# Patient Record
Sex: MALE | URBAN - METROPOLITAN AREA
[De-identification: names, ages, dates, MRNs, and addresses within clinical notes are randomized per-mention and may not be internally consistent; named-entity substitution may affect disease eponyms.]

---

## 2017-03-24 ENCOUNTER — TELEPHONE (OUTPATIENT)
Dept: OPHTHALMOLOGY | Facility: CLINIC | Age: 27
End: 2017-03-24

## 2017-03-24 NOTE — TELEPHONE ENCOUNTER
Pt returned call  Pt stated no eye pain  Started having right eye proptosis last June and was having blurred vision, but mostly due to exposure  Blurred vision has improved    Offered appt next tues with dr. Sherwood for eval first, but unable-- work notices/finances for trip to [place]  No neuro clinic following week (no neuro appts til thurs) and dr. Rodas out     Scheduled with dr. Lux in 2 week time frame and early in week eval if orbital decompression needed    Note to facilitators for review  Pt aware appt could change to another provided pending review--   Yaw Lawton RN 4:51 PM 03/24/17

## 2017-03-24 NOTE — TELEPHONE ENCOUNTER
H/o thyroid eye disease  Pt seen recently by Dr. Cecil Hernandez  Worsening eye pain and concern may need orbital decompression per voicemessage from referring clinic-- was unable to reach on call back  Only number in our [facility]ia [facility] system in our system for pt  Called twice today and left message with direct number-- other 2 number in pt demographics not working  No call back by 1615    Has been seen by Dr. Rodas in past (Dr. Rodas out next week)  Would like to schedule early next week with neuro ophthalmology for nerve changes and eval if orbital decompression needed when pt calls back    Note to facilitator with dr. Sherwood/p eye triage for call back Monday  Yaw Lawton RN 4:20 PM 03/24/17

## 2017-03-24 NOTE — TELEPHONE ENCOUNTER
"----- Message from Jannette Briggs sent at 3/24/2017 12:10 PM CDT -----  Regarding: Aditi from Dr. Cecil Hernandez's office in [place], [place], called to schedule Pt...  Contact: [phone]  With Thyroid eye disease & occular plastics.  Pt needs to come in within the next \"2 weeks\"!   First avail was not until 5/15/17.    Please call Pt at ph# [phone] to schedule him, please.  If need be, you can reach Aditi at ph# [phone].    Thank you!  [place]    Please DO NOT send this message and/or reply back to sender.  Call Center Representatives DO NOT respond to messages.    "

## 2017-03-29 DIAGNOSIS — E07.9 THYROID EYE DISEASE: Primary | ICD-10-CM

## 2017-03-29 DIAGNOSIS — H57.89 THYROID EYE DISEASE: Primary | ICD-10-CM

## 2017-04-03 ENCOUNTER — OFFICE VISIT (OUTPATIENT)
Dept: OPHTHALMOLOGY | Facility: CLINIC | Age: 27
End: 2017-04-03

## 2017-04-03 DIAGNOSIS — H05.243 CONSTANT EXOPHTHALMOS OF BOTH EYES: ICD-10-CM

## 2017-04-03 DIAGNOSIS — E07.9 THYROID EYE DISEASE: Primary | ICD-10-CM

## 2017-04-03 DIAGNOSIS — H57.89 THYROID EYE DISEASE: Primary | ICD-10-CM

## 2017-04-03 DIAGNOSIS — H16.213 EXPOSURE KERATOCONJUNCTIVITIS, BILATERAL: ICD-10-CM

## 2017-04-03 RX ORDER — PROPRANOLOL HYDROCHLORIDE 10 MG/1
10 TABLET ORAL
COMMUNITY
Start: 2016-08-12 | End: 2017-04-25

## 2017-04-03 RX ORDER — LEVOTHYROXINE SODIUM 125 UG/1
125 TABLET ORAL EVERY MORNING
COMMUNITY
Start: 2017-03-08

## 2017-04-03 RX ORDER — ERYTHROMYCIN 5 MG/G
OINTMENT OPHTHALMIC
COMMUNITY
Start: 2016-05-24

## 2017-04-03 ASSESSMENT — VISUAL ACUITY
OS_CC: 20/20
METHOD: SNELLEN - LINEAR
OD_CC+: -2
OD_CC: 20/40

## 2017-04-03 ASSESSMENT — CUP TO DISC RATIO
OD_RATIO: 0.4
OS_RATIO: 0.4

## 2017-04-03 ASSESSMENT — TONOMETRY
OD_IOP_MMHG: 12
OS_IOP_MMHG: 15
IOP_METHOD: ICARE

## 2017-04-03 ASSESSMENT — CONF VISUAL FIELD
METHOD: COUNTING FINGERS
OS_NORMAL: 1
OD_NORMAL: 1

## 2017-04-03 ASSESSMENT — EXTERNAL EXAM - RIGHT EYE: OD_EXAM: EXOPHTHALMOS

## 2017-04-03 ASSESSMENT — EXTERNAL EXAM - LEFT EYE: OS_EXAM: EXOPHTHALMOS

## 2017-04-03 NOTE — MR AVS SNAPSHOT
After Visit Summary   4/3/2017    Valentino Conner    MRN: [id number]           Patient Information     Date Of Birth          1990        Visit Information        Provider Department      4/3/2017 1:15 PM Madhav Lux MD [facility]        Today's Diagnoses     Thyroid eye disease    -  1    Constant exophthalmos of both eyes        Exposure keratoconjunctivitis, bilateral           Follow-ups after your visit        Follow-up notes from your care team     Return in about 4 weeks (around 2017).      Future tests that were ordered for you today     Open Future Orders        Priority Expected Expires Ordered    External Photos Thyroid Series Routine  4/3/2018 4/3/2017            Who to contact     Please call your clinic at [phone] to:    Ask questions about your health    Make or cancel appointments    Discuss your medicines    Learn about your test results    Speak to your doctor   If you have compliments or concerns about an experience at your clinic, or if you wish to file a complaint, please contact [facility] Patient Relations at [phone] or email us at [email or web address]         Additional Information About Your Visit        [employer] Information     [employer] is an electronic gateway that provides easy, online access to your medical records. With [employer], you can request a clinic appointment, read your test results, renew a prescription or communicate with your care team.     To sign up for [employer] visit the website at [email or web address]   You will be asked to enter the access code listed below, as well as some personal information. Please follow the directions to create your username and password.     Your access code is: [email or web address]  Expires: 2017  6:30 AM     Your access code will  in 90 days. If you need help or a new code, please contact your [facility] Clinic or call [phone]  for assistance.        Care EveryWhere ID     This is your Care EveryWhere ID. This could be used by other organizations to access your [facility] medical records  [id number]         Blood Pressure from Last 3 Encounters:   No data found for BP    Weight from Last 3 Encounters:   No data found for Wt              We Performed the Following     Collette-Operative Worksheet (Plastics)        Primary Care Provider    No Pcp Confirmed       No address on file        Thank you!     Thank you for choosing [facility]  for your care. Our goal is always to provide you with excellent care. Hearing back from our patients is one way we can continue to improve our services. Please take a few minutes to complete the written survey that you may receive in the mail after your visit with us. Thank you!             Your Updated Medication List - Protect others around you: Learn how to safely use, store and throw away your medicines at www.disposemymeds.org.          This list is accurate as of: 4/3/17  3:37 PM.  Always use your most recent med list.                   Brand Name Dispense Instructions for use    erythromycin ophthalmic ointment    ROMYCIN     Place  into the right eye four times a day.       levothyroxine 125 MCG tablet    SYNTHROID/LEVOTHROID         moxifloxacin 0.5 % ophthalmic solution    VIGAMOX     Place 1 drop into both eyes every 2 hours (while awake)       propranolol 10 MG tablet    INDERAL     Take 10 mg by mouth

## 2017-04-03 NOTE — PROGRESS NOTES
Valentino Conner is a 26 year old male who presents for followup of APRIL. He had thyroid ablation in March 7, started levothyroxine 125 mg and propranolol 10 mg every other day, and feels like tachycardia is improving. He feels like proptosis is slightly better, vision is slightly better in right eye. There is some improvement in irritation of the right eye    Denies diplopia    TSIG 6.0 8/2016  IGG 2200 8/2016    Assessment & Plan      Valentino Conner is a 26 year old male with the following diagnoses:   1. Thyroid eye disease    2. Constant exophthalmos of both eyes    3. Exposure keratoconjunctivitis, bilateral       -VA stable at about 20/40  -Proptosis stable at about 105/30/26  -Denies diplopia, but has significant restrictions all gazes  -Significant lower lig retraction and upper lid lag    PLAN:  Schedule bilateral 3 wall decompression with ENT and Dr. Clark Sherwood MD    Attending Physician Attestation:  I have seen and examined this patient .  I have confirmed and edited as necessary the chief complaint(s), history of present illness, review of systems, relevant history, and examination findings as documented by others.  I have personally reviewed the relevant tests, images, and reports as documented above.  I have confirmed and edited as necessary the assessment and plan and agree with this note.    - Madhav Lux MD 3:33 PM 4/3/2017    Photographs were obtained to document the findings recorded under exam. These will be stored for future reference and comparison. The plan is documented under assessment and plan above.   - Madhav Lux MD 3:33 PM 4/3/2017  Today with Valentino Conner, I reviewed the indications, risks, benefits, and alternatives of the proposed surgical procedure including, but not limited to, failure obtain the desired result  and need for additional surgery, bleeding, infection, loss of vision, loss of the eye, and the remote possibility of permanent damage  to any organ system or death with the use of anesthesia.  I provided multiple opportunities for the questions, answered all questions to the best of my ability, and confirmed that my answers and my discussion were understood.     - Madhav Lux MD 3:33 PM 4/3/2017

## 2017-04-03 NOTE — Clinical Note
Cyndi I saw this shruti in clinic today. He still needs a 3wall decompression. I had Lizz set it up with you and Shep asap.  Isreal

## 2017-04-03 NOTE — NURSING NOTE
Chief Complaints and History of Present Illnesses   Patient presents with     Follow Up For     Thyroid eye disease     HPI    Affected eye(s):  Both   Symptoms:     Blurred vision   No double vision      Frequency:  Constant       Do you have eye pain now?:  No      Comments:  Pt states here for a consult for an orbital decompression- pt states vision in left eye is fine but decreased in the right. No pain. AT PRN OU, Erythromycin QID OD.    Alexi Ambrose COT 2:36 PM April 3, 2017

## 2017-04-25 RX ORDER — CYANOCOBALAMIN (VITAMIN B-12) 1000 MCG
200 TABLET, EXTENDED RELEASE ORAL DAILY
COMMUNITY

## 2017-04-26 ENCOUNTER — ANESTHESIA EVENT (OUTPATIENT)
Dept: SURGERY | Facility: AMBULATORY SURGERY CENTER | Age: 27
End: 2017-04-26

## 2017-04-26 NOTE — ANESTHESIA PREPROCEDURE EVALUATION
Physical Exam  Normal systems: cardiovascular, pulmonary and dental    Airway   Mallampati: II  TM distance: >3 FB  Neck ROM: full    Dental     Cardiovascular   Rhythm and rate: regular and normal      Pulmonary    breath sounds clear to auscultation                    Anesthesia Plan      History & Physical Review  History and physical reviewed and following examination; no interval change.    ASA Status:  2 .    NPO Status:  > 6 hours    Plan for General and ETT with Propofol and Intravenous induction. Maintenance will be Balanced.    PONV prophylaxis:  Ondansetron (or other 5HT-3) and Dexamethasone or Solumedrol       Postoperative Care  Postoperative pain management:  IV analgesics, Oral pain medications and Multi-modal analgesia.      Consents  Anesthetic plan, risks, benefits and alternatives discussed with:  Patient..                        ANESTHESIA PREOP EVALUATION    PROCEDURE: Procedure(s):  Bilateral Endoscopic Medial Orbital Decompression, Bilateral Three Wall Orbital Decompression with Shawna Navigation and Sonopet - Wound Class:    - Wound Class:     HPI: Valentino Conner is a 26 year old male who presents for above procedure.    PAST MEDICAL HISTORY:    Past Medical History:   Diagnosis Date     Thyroid disease     Graves       PAST SURGICAL HISTORY:    Past Surgical History:   Procedure Laterality Date     THYROIDECTOMY         PAST ANESTHESIA HISTORY:     No personal or family h/o anesthesia problems    SOCIAL HISTORY:       Social History   Substance Use Topics     Smoking status: Never Smoker     Smokeless tobacco: Not on file     Alcohol use Yes      Comment: rare once per year       ALLERGIES:     No Known Allergies    MEDICATIONS:       (Not in a hospital admission)    Current Outpatient Prescriptions   Medication Sig Dispense Refill     VITAMIN D, CHOLECALCIFEROL, PO Take 2,000 Units by mouth daily       Selenium 200 MCG CAPS Take 200 mcg by mouth daily       levothyroxine  (SYNTHROID/LEVOTHROID) 125 MCG tablet Take 125 mcg by mouth every morning        erythromycin (ROMYCIN) ophthalmic ointment Place  into the right eye four times a day.         Current Outpatient Prescriptions Ordered in [facility]   Medication Sig Dispense Refill     VITAMIN D, CHOLECALCIFEROL, PO Take 2,000 Units by mouth daily       Selenium 200 MCG CAPS Take 200 mcg by mouth daily       levothyroxine (SYNTHROID/LEVOTHROID) 125 MCG tablet Take 125 mcg by mouth every morning        erythromycin (ROMYCIN) ophthalmic ointment Place  into the right eye four times a day.       No current [facility]-ordered facility-administered medications on file.        PHYSICAL EXAM:    Vitals: T Data Unavailable, P Data Unavailable, BP Data Unavailable, R Data Unavailable, SpO2  , Weight Wt Readings from Last 2 Encounters:   No data found for Wt       See doc flowsheet      No Data Recorded    No Data Recorded    No Data Recorded    No Data Recorded    No Data Recorded    No data recorded.  No data recorded.      NPO STATUS: see doc flowsheet    LABS:    BMP:  No results for input(s): NA, POTASSIUM, CHLORIDE, CO2, BUN, CR, GLC, VELASQUEZ in the last 45018 hours.    LFTs:   No results for input(s): PROTTOTAL, ALBUMIN, BILITOTAL, ALKPHOS, AST, ALT, BILIDIRECT in the last 53513 hours.    CBC:   No results for input(s): WBC, RBC, HGB, HCT, MCV, MCH, MCHC, RDW, PLT in the last 39739 hours.    Coags:  No results for input(s): INR, PTT, FIBR in the last 20375 hours.    Imaging:  No orders to display       Can Benavides MD  Anesthesiology Staff  Pager [phone]    4/26/2017  4:29 PM

## 2017-04-27 ENCOUNTER — ANESTHESIA (OUTPATIENT)
Dept: SURGERY | Facility: AMBULATORY SURGERY CENTER | Age: 27
End: 2017-04-27

## 2017-04-27 ENCOUNTER — HOSPITAL ENCOUNTER (OUTPATIENT)
Facility: AMBULATORY SURGERY CENTER | Age: 27
End: 2017-04-27
Attending: OPHTHALMOLOGY

## 2017-04-27 ENCOUNTER — SURGERY (OUTPATIENT)
Age: 27
End: 2017-04-27

## 2017-04-27 VITALS
OXYGEN SATURATION: 92 % | HEIGHT: 67 IN | RESPIRATION RATE: 16 BRPM | DIASTOLIC BLOOD PRESSURE: 81 MMHG | SYSTOLIC BLOOD PRESSURE: 131 MMHG | TEMPERATURE: 98 F | WEIGHT: 225 LBS | BODY MASS INDEX: 35.31 KG/M2

## 2017-04-27 DIAGNOSIS — E05.00 GRAVES' OPHTHALMOPATHY: Primary | ICD-10-CM

## 2017-04-27 RX ORDER — CEPHALEXIN 500 MG/1
500 CAPSULE ORAL 2 TIMES DAILY
Qty: 20 CAPSULE | Refills: 0 | Status: SHIPPED | OUTPATIENT
Start: 2017-04-27 | End: 2017-05-07

## 2017-04-27 RX ORDER — TRIAMCINOLONE ACETONIDE 40 MG/ML
INJECTION, SUSPENSION INTRA-ARTICULAR; INTRAMUSCULAR PRN
Status: DISCONTINUED | OUTPATIENT
Start: 2017-04-27 | End: 2017-04-27 | Stop reason: HOSPADM

## 2017-04-27 RX ORDER — SODIUM CHLORIDE, SODIUM LACTATE, POTASSIUM CHLORIDE, CALCIUM CHLORIDE 600; 310; 30; 20 MG/100ML; MG/100ML; MG/100ML; MG/100ML
INJECTION, SOLUTION INTRAVENOUS CONTINUOUS
Status: DISCONTINUED | OUTPATIENT
Start: 2017-04-27 | End: 2017-04-27 | Stop reason: HOSPADM

## 2017-04-27 RX ORDER — ONDANSETRON 4 MG/1
4 TABLET, ORALLY DISINTEGRATING ORAL EVERY 30 MIN PRN
Status: DISCONTINUED | OUTPATIENT
Start: 2017-04-27 | End: 2017-04-28 | Stop reason: HOSPADM

## 2017-04-27 RX ORDER — ONDANSETRON 4 MG/1
4-8 TABLET, ORALLY DISINTEGRATING ORAL EVERY 8 HOURS PRN
Qty: 10 TABLET | Refills: 1 | Status: SHIPPED | OUTPATIENT
Start: 2017-04-27

## 2017-04-27 RX ORDER — FENTANYL CITRATE 50 UG/ML
25-50 INJECTION, SOLUTION INTRAMUSCULAR; INTRAVENOUS
Status: DISCONTINUED | OUTPATIENT
Start: 2017-04-27 | End: 2017-04-28 | Stop reason: HOSPADM

## 2017-04-27 RX ORDER — OXYCODONE AND ACETAMINOPHEN 5; 325 MG/1; MG/1
1 TABLET ORAL EVERY 4 HOURS PRN
Qty: 40 TABLET | Refills: 0 | Status: SHIPPED | OUTPATIENT
Start: 2017-04-27 | End: 2017-04-27

## 2017-04-27 RX ORDER — PROPOFOL 10 MG/ML
INJECTION, EMULSION INTRAVENOUS PRN
Status: DISCONTINUED | OUTPATIENT
Start: 2017-04-27 | End: 2017-04-27

## 2017-04-27 RX ORDER — AMOXICILLIN 250 MG
1-2 CAPSULE ORAL 2 TIMES DAILY
Qty: 30 TABLET | Refills: 0 | Status: SHIPPED | OUTPATIENT
Start: 2017-04-27 | End: 2017-04-27

## 2017-04-27 RX ORDER — GLYCOPYRROLATE 0.2 MG/ML
INJECTION, SOLUTION INTRAMUSCULAR; INTRAVENOUS PRN
Status: DISCONTINUED | OUTPATIENT
Start: 2017-04-27 | End: 2017-04-27

## 2017-04-27 RX ORDER — MEPERIDINE HYDROCHLORIDE 25 MG/ML
12.5 INJECTION INTRAMUSCULAR; INTRAVENOUS; SUBCUTANEOUS
Status: DISCONTINUED | OUTPATIENT
Start: 2017-04-27 | End: 2017-04-28 | Stop reason: HOSPADM

## 2017-04-27 RX ORDER — DEXAMETHASONE SODIUM PHOSPHATE 10 MG/ML
10 INJECTION, SOLUTION INTRAMUSCULAR; INTRAVENOUS ONCE
Status: COMPLETED | OUTPATIENT
Start: 2017-04-27 | End: 2017-04-27

## 2017-04-27 RX ORDER — METHYLPREDNISOLONE 4 MG
TABLET, DOSE PACK ORAL
Qty: 21 TABLET | Refills: 0 | Status: SHIPPED | OUTPATIENT
Start: 2017-04-27

## 2017-04-27 RX ORDER — KETOROLAC TROMETHAMINE 30 MG/ML
30 INJECTION, SOLUTION INTRAMUSCULAR; INTRAVENOUS EVERY 6 HOURS PRN
Status: DISCONTINUED | OUTPATIENT
Start: 2017-04-27 | End: 2017-04-28 | Stop reason: HOSPADM

## 2017-04-27 RX ORDER — ERYTHROMYCIN 5 MG/G
OINTMENT OPHTHALMIC
Qty: 3.5 G | Refills: 0 | Status: SHIPPED | OUTPATIENT
Start: 2017-04-27

## 2017-04-27 RX ORDER — ONDANSETRON 2 MG/ML
4 INJECTION INTRAMUSCULAR; INTRAVENOUS EVERY 30 MIN PRN
Status: DISCONTINUED | OUTPATIENT
Start: 2017-04-27 | End: 2017-04-28 | Stop reason: HOSPADM

## 2017-04-27 RX ORDER — ONDANSETRON 2 MG/ML
INJECTION INTRAMUSCULAR; INTRAVENOUS PRN
Status: DISCONTINUED | OUTPATIENT
Start: 2017-04-27 | End: 2017-04-27

## 2017-04-27 RX ORDER — TOBRAMYCIN AND DEXAMETHASONE 3; 1 MG/ML; MG/ML
1 SUSPENSION/ DROPS OPHTHALMIC 4 TIMES DAILY
Qty: 2 ML | Refills: 0 | Status: SHIPPED | OUTPATIENT
Start: 2017-04-27 | End: 2017-05-07

## 2017-04-27 RX ORDER — LIDOCAINE HYDROCHLORIDE 20 MG/ML
INJECTION, SOLUTION INFILTRATION; PERINEURAL PRN
Status: DISCONTINUED | OUTPATIENT
Start: 2017-04-27 | End: 2017-04-27

## 2017-04-27 RX ORDER — AMOXICILLIN 250 MG
1-2 CAPSULE ORAL 2 TIMES DAILY
Qty: 30 TABLET | Refills: 0 | Status: SHIPPED | OUTPATIENT
Start: 2017-04-27

## 2017-04-27 RX ORDER — LIDOCAINE HYDROCHLORIDE AND EPINEPHRINE 10; 10 MG/ML; UG/ML
INJECTION, SOLUTION INFILTRATION; PERINEURAL PRN
Status: DISCONTINUED | OUTPATIENT
Start: 2017-04-27 | End: 2017-04-27 | Stop reason: HOSPADM

## 2017-04-27 RX ORDER — FENTANYL CITRATE 50 UG/ML
INJECTION, SOLUTION INTRAMUSCULAR; INTRAVENOUS PRN
Status: DISCONTINUED | OUTPATIENT
Start: 2017-04-27 | End: 2017-04-27

## 2017-04-27 RX ORDER — SODIUM CHLORIDE, SODIUM LACTATE, POTASSIUM CHLORIDE, CALCIUM CHLORIDE 600; 310; 30; 20 MG/100ML; MG/100ML; MG/100ML; MG/100ML
INJECTION, SOLUTION INTRAVENOUS CONTINUOUS
Status: DISCONTINUED | OUTPATIENT
Start: 2017-04-27 | End: 2017-04-28 | Stop reason: HOSPADM

## 2017-04-27 RX ORDER — LIDOCAINE 40 MG/G
CREAM TOPICAL
Status: DISCONTINUED | OUTPATIENT
Start: 2017-04-27 | End: 2017-04-27 | Stop reason: HOSPADM

## 2017-04-27 RX ORDER — CEFAZOLIN SODIUM 1 G/3ML
1 INJECTION, POWDER, FOR SOLUTION INTRAMUSCULAR; INTRAVENOUS SEE ADMIN INSTRUCTIONS
Status: DISCONTINUED | OUTPATIENT
Start: 2017-04-27 | End: 2017-04-27 | Stop reason: HOSPADM

## 2017-04-27 RX ORDER — GABAPENTIN 300 MG/1
300 CAPSULE ORAL ONCE
Status: COMPLETED | OUTPATIENT
Start: 2017-04-27 | End: 2017-04-27

## 2017-04-27 RX ORDER — OXYCODONE AND ACETAMINOPHEN 5; 325 MG/1; MG/1
1-2 TABLET ORAL EVERY 4 HOURS PRN
Qty: 40 TABLET | Refills: 0 | Status: SHIPPED | OUTPATIENT
Start: 2017-04-27

## 2017-04-27 RX ORDER — ACETAMINOPHEN 325 MG/1
975 TABLET ORAL ONCE
Status: COMPLETED | OUTPATIENT
Start: 2017-04-27 | End: 2017-04-27

## 2017-04-27 RX ORDER — NALOXONE HYDROCHLORIDE 0.4 MG/ML
.1-.4 INJECTION, SOLUTION INTRAMUSCULAR; INTRAVENOUS; SUBCUTANEOUS
Status: DISCONTINUED | OUTPATIENT
Start: 2017-04-27 | End: 2017-04-28 | Stop reason: HOSPADM

## 2017-04-27 RX ORDER — OXYMETAZOLINE HYDROCHLORIDE 0.05 G/100ML
SPRAY NASAL PRN
Status: DISCONTINUED | OUTPATIENT
Start: 2017-04-27 | End: 2017-04-27 | Stop reason: HOSPADM

## 2017-04-27 RX ADMIN — DEXAMETHASONE SODIUM PHOSPHATE 10 MG: 10 INJECTION, SOLUTION INTRAMUSCULAR; INTRAVENOUS at 09:02

## 2017-04-27 RX ADMIN — Medication 0.5 MG: at 09:27

## 2017-04-27 RX ADMIN — Medication 10 MG: at 12:10

## 2017-04-27 RX ADMIN — LIDOCAINE HYDROCHLORIDE AND EPINEPHRINE 14 ML: 10; 10 INJECTION, SOLUTION INFILTRATION; PERINEURAL at 09:46

## 2017-04-27 RX ADMIN — TRIAMCINOLONE ACETONIDE 40 MG: 40 INJECTION, SUSPENSION INTRA-ARTICULAR; INTRAMUSCULAR at 12:31

## 2017-04-27 RX ADMIN — Medication 10 MG: at 09:43

## 2017-04-27 RX ADMIN — ONDANSETRON 4 MG: 2 INJECTION INTRAMUSCULAR; INTRAVENOUS at 12:33

## 2017-04-27 RX ADMIN — Medication 10 MG: at 10:42

## 2017-04-27 RX ADMIN — Medication 10 MG: at 09:27

## 2017-04-27 RX ADMIN — LIDOCAINE HYDROCHLORIDE 100 MG: 20 INJECTION, SOLUTION INFILTRATION; PERINEURAL at 08:50

## 2017-04-27 RX ADMIN — Medication 0.5 MG: at 12:14

## 2017-04-27 RX ADMIN — Medication 40 MG: at 08:50

## 2017-04-27 RX ADMIN — Medication 10 MG: at 11:29

## 2017-04-27 RX ADMIN — FENTANYL CITRATE 100 MCG: 50 INJECTION, SOLUTION INTRAMUSCULAR; INTRAVENOUS at 08:48

## 2017-04-27 RX ADMIN — GLYCOPYRROLATE 0.2 MG: 0.2 INJECTION, SOLUTION INTRAMUSCULAR; INTRAVENOUS at 08:48

## 2017-04-27 RX ADMIN — OXYMETAZOLINE HYDROCHLORIDE 15 ML: 0.05 SPRAY NASAL at 09:46

## 2017-04-27 RX ADMIN — ACETAMINOPHEN 975 MG: 325 TABLET ORAL at 07:39

## 2017-04-27 RX ADMIN — Medication 0.5 MG: at 10:59

## 2017-04-27 RX ADMIN — Medication 10 MG: at 09:56

## 2017-04-27 RX ADMIN — SODIUM CHLORIDE, SODIUM LACTATE, POTASSIUM CHLORIDE, CALCIUM CHLORIDE: 600; 310; 30; 20 INJECTION, SOLUTION INTRAVENOUS at 07:40

## 2017-04-27 RX ADMIN — Medication 10 MG: at 09:11

## 2017-04-27 RX ADMIN — PROPOFOL 200 MG: 10 INJECTION, EMULSION INTRAVENOUS at 08:50

## 2017-04-27 RX ADMIN — GABAPENTIN 300 MG: 300 CAPSULE ORAL at 07:39

## 2017-04-27 NOTE — IP AVS SNAPSHOT
"                  MRN:[id number]                      After Visit Summary   4/27/2017    Valentino Conner    MRN: [id number]           Thank you!     Thank you for choosing [facility] for your care. Our goal is always to provide you with excellent care. Hearing back from our patients is one way we can continue to improve our services. Please take a few minutes to complete the written survey that you may receive in the mail after you visit with us. Thank you!        Patient Information     Date Of Birth          1990        About your hospital stay     You were admitted on:  April 27, 2017 [facility]    You were discharged on:  April 27, 2017       Who to Call     For medical emergencies, please call 911.  For non-urgent questions about your medical care, please call your primary care provider or clinic, None  For questions related to your surgery, please call your surgery clinic        Attending Provider     Provider Madhav Nicolas MD Ophthalmology       Primary Care Provider    No Pcp Confirmed       No address on file        After Care Instructions     Diet Instructions       Resume pre procedure diet            Discharge Instructions       Follow up with Dr Augustin in 2-3 weeks. If you have questions or concerns during the day please call ENT clinic at [phone]. After hours you may call [facility] at [phone] and ask for the \"ENT resident on call\".    -- Open your mouth when you sneeze or cough as to not build up pressure in your sinuses. Try not to lean forward and allow blood to build up in your head. Do not blow your nose. No lifting greater than 10 lbs for 2 weeks.  -- Please notify your doctor if you experience increased purulent nasal drainage and fevers (greater than 101.4)  suspicious for infection. If you feel it is acute, please return to the emergency department.   -- Bloody nasal drainage is expected the first few " "days. Use nasal saline frequently throughout day. Use the oxymetazoline spray only if there is active bright red bleeding. If active bleeding, spray each nostril with the oxymetazoline and hold pressure x 20 minutes. If this does not resolve, repeat. If it persists, go to the ER to be evaluated.  -- You should take over-the-counter stool softeners when you are taking narcotics such as vicodin/percocet/oxycodone/norco. Wean yourself from the narcotic pain medicines as soon as able. Transition to over-the-counter tylenol (650mg every 4 hours as needed).  -- If you have questions or concerns during the day please call ENT clinic and [phone]. After hours you may call [facility] at [phone] and ask for the \"ENT resident on call\".            Discharge Medication Instructions       Do NOT take aspirin or medications containing NSAIDS for 72 hours after procedure.            Ice to affected area       Apply cold pack for 15 minutes on, 15 minutes off, for 48 hours while awake.                  Further instructions from your care team       [facility]  Home Care Following Anesthesia  For 24 hours after surgery:  1. Get plenty of rest.  A responsible adult must stay with you for at least 24 hours after you leave the surgery center.  2. Do not drive or use heavy equipment.  If you have weakness or tingling, don't drive or use heavy equipment until this feeling goes away.   3. Do not drink alcohol.   4. Avoid strenuous or risky activities.  Ask for help when climbing stairs.  5. You may feel lightheaded.  IF so, sit for a few minutes before standing.  Have someone help you get up.   6. If you have nausea (feel sick to your stomach): Drink only clear liquids such as apple juice, ginger ale, broth or 7-Up.  Rest may also help.  Be sure to drink enough fluids.  Move to a regular diet as you feel able.   7. You may have a slight fever.  Call the doctor if your fever is over " 100 F (37.7 C) (taken under the tongue) or lasts longer than 24 hours.  8. You may have a dry mouth, a sore throat, muscle aches or trouble sleeping. These should go away after 24 hours.  9. Do not make important or legal decisions.               Tips for taking pain medications  To get the best pain relief possible, remember these points:    Take pain medications as directed, before pain becomes severe.    Pain medication can upset your stomach: taking it with food may help.    Constipation is a common side effect of pain medication. Drink plenty of  fluids.    Eat foods high in fiber. Take a stool softener if recommended by your doctor or pharmacist.    Do not drink alcohol, drive or operate machinery while taking pain medications.    Ask about other ways to control pain, such as with heat, ice or relaxation.    Call a doctor for any of the followin. Signs of infection (fever, growing tenderness at the surgery site, a large amount of drainage or bleeding, severe pain, foul-smelling drainage, redness, swelling).  2. It has been over 8 to 10 hours since surgery and you are still not able to urinate (pass water).  3. Headache for over 24 hours.  4. Numbness, tingling or weakness the day after surgery (if you had spinal anesthesia).  Your doctor is:  Dr. Ty Augustin, ENT Otolaryngology: [phone]  Dr. Cyndi Rodas, Ophthalmology: [phone]               Or dial [phone] and ask for the resident on call for:  Ophthalmology or ENT  For emergency care, call the:  [facility]:  [phone] (TTY for hearing impaired: [phone])                Pending Results     No orders found from 2017 to 2017.            Admission Information     Date & Time Provider Department Dept. Phone    2017 Madhav Lux MD [facility] [phone]      Your Vitals Were     Blood Pressure Temperature Respirations Height Weight Pulse Oximetry    133/80 98.6  F  "(37  C) (Temporal) 16 1.702 m (5' 7\") 102.1 kg (225 lb) 100%    BMI (Body Mass Index)                   35.24 kg/m2           MyChart Information     [employer] is an electronic gateway that provides easy, online access to your medical records. With [employer], you can request a clinic appointment, read your test results, renew a prescription or communicate with your care team.     To sign up for [employer] visit the website at [email or web address]   You will be asked to enter the access code listed below, as well as some personal information. Please follow the directions to create your username and password.     Your access code is: [email or web address]  Expires: 2017  6:30 AM     Your access code will  in 90 days. If you need help or a new code, please contact your [facility] or call [phone] for assistance.        Care EveryWhere ID     This is your Care EveryWhere ID. This could be used by other organizations to access your [facility] medical records  [id number]           Review of your medicines      START taking        Dose / Directions    cephALEXin 500 MG capsule   Commonly known as:  KEFLEX   Used for:  Graves' ophthalmopathy        Dose:  500 mg   Take 1 capsule (500 mg) by mouth 2 times daily for 10 days   Quantity:  20 capsule   Refills:  0       methylPREDNISolone 4 MG tablet   Commonly known as:  MEDROL DOSEPAK   Used for:  Graves' ophthalmopathy        Follow package instructions   Quantity:  21 tablet   Refills:  0       ondansetron 4 MG ODT tab   Commonly known as:  ZOFRAN ODT   Used for:  Graves' ophthalmopathy        Dose:  4-8 mg   Take 1-2 tablets (4-8 mg) by mouth every 8 hours as needed for nausea   Quantity:  10 tablet   Refills:  1       oxyCODONE-acetaminophen 5-325 MG per tablet   Commonly known as:  PERCOCET   Used for:  Graves' ophthalmopathy        Dose:  1-2 tablet   Take 1-2 tablets by mouth every 4 hours as needed for pain   Quantity:  40 tablet "   Refills:  0       senna-docusate 8.6-50 MG per tablet   Commonly known as:  SENOKOT-S;PERICOLACE   Used for:  Graves' ophthalmopathy        Dose:  1-2 tablet   Take 1-2 tablets by mouth 2 times daily Take while on oral narcotics to prevent or treat constipation.   Quantity:  30 tablet   Refills:  0       tobramycin-dexamethasone 0.3-0.1 % ophthalmic susp   Commonly known as:  TOBRADEX   Used for:  Graves' ophthalmopathy        Dose:  1 drop   Place 1 drop into both eyes 4 times daily for 10 days Instill into operative eye(s) per physician instructions.   Quantity:  2 mL   Refills:  0         CONTINUE these medicines which may have CHANGED, or have new prescriptions. If we are uncertain of the size of tablets/capsules you have at home, strength may be listed as something that might have changed.        Dose / Directions    * erythromycin ophthalmic ointment   Commonly known as:  ROMYCIN   This may have changed:  Another medication with the same name was added. Make sure you understand how and when to take each.        Place  into the right eye four times a day.   Refills:  0       * erythromycin ophthalmic ointment   Commonly known as:  ROMYCIN   This may have changed:  You were already taking a medication with the same name, and this prescription was added. Make sure you understand how and when to take each.   Used for:  Graves' ophthalmopathy        Apply small amount to incision sites and into both eyes 4 x daily   Quantity:  3.5 g   Refills:  0       * Notice:  This list has 2 medication(s) that are the same as other medications prescribed for you. Read the directions carefully, and ask your doctor or other care provider to review them with you.      CONTINUE these medicines which have NOT CHANGED        Dose / Directions    levothyroxine 125 MCG tablet   Commonly known as:  SYNTHROID/LEVOTHROID        Dose:  125 mcg   Take 125 mcg by mouth every morning   Refills:  0       Selenium 200 MCG Caps        Dose:   200 mcg   Take 200 mcg by mouth daily   Refills:  0       VITAMIN D (CHOLECALCIFEROL) PO        Dose:  2000 Units   Take 2,000 Units by mouth daily   Refills:  0            Where to get your medicines      These medications were sent to [facility] [place], [place] - [address]  [address], [facility] [address]    Hours:  TRANSPLANT PHONE NUMBER [phone] Phone:  [phone]     cephALEXin 500 MG capsule    erythromycin ophthalmic ointment    methylPREDNISolone 4 MG tablet    ondansetron 4 MG ODT tab    senna-docusate 8.6-50 MG per tablet    tobramycin-dexamethasone 0.3-0.1 % ophthalmic susp         Some of these will need a paper prescription and others can be bought over the counter. Ask your nurse if you have questions.     Bring a paper prescription for each of these medications     oxyCODONE-acetaminophen 5-325 MG per tablet                Protect others around you: Learn how to safely use, store and throw away your medicines at www.disposemymeds.org.             Medication List: This is a list of all your medications and when to take them. Check marks below indicate your daily home schedule. Keep this list as a reference.      Medications           Morning Afternoon Evening Bedtime As Needed    cephALEXin 500 MG capsule   Commonly known as:  KEFLEX   Take 1 capsule (500 mg) by mouth 2 times daily for 10 days                                * erythromycin ophthalmic ointment   Commonly known as:  ROMYCIN   Place  into the right eye four times a day.                                * erythromycin ophthalmic ointment   Commonly known as:  ROMYCIN   Apply small amount to incision sites and into both eyes 4 x daily                                levothyroxine 125 MCG tablet   Commonly known as:  SYNTHROID/LEVOTHROID   Take 125 mcg by mouth every morning                                methylPREDNISolone 4 MG tablet   Commonly known as:  MEDROL DOSEPAK    Follow package instructions                                ondansetron 4 MG ODT tab   Commonly known as:  ZOFRAN ODT   Take 1-2 tablets (4-8 mg) by mouth every 8 hours as needed for nausea                                oxyCODONE-acetaminophen 5-325 MG per tablet   Commonly known as:  PERCOCET   Take 1-2 tablets by mouth every 4 hours as needed for pain                                Selenium 200 MCG Caps   Take 200 mcg by mouth daily                                senna-docusate 8.6-50 MG per tablet   Commonly known as:  SENOKOT-S;PERICOLACE   Take 1-2 tablets by mouth 2 times daily Take while on oral narcotics to prevent or treat constipation.                                tobramycin-dexamethasone 0.3-0.1 % ophthalmic susp   Commonly known as:  TOBRADEX   Place 1 drop into both eyes 4 times daily for 10 days Instill into operative eye(s) per physician instructions.                                VITAMIN D (CHOLECALCIFEROL) PO   Take 2,000 Units by mouth daily                                * Notice:  This list has 2 medication(s) that are the same as other medications prescribed for you. Read the directions carefully, and ask your doctor or other care provider to review them with you.

## 2017-04-27 NOTE — DISCHARGE INSTRUCTIONS
[facility]  Home Care Following Anesthesia  For 24 hours after surgery:  1. Get plenty of rest.  A responsible adult must stay with you for at least 24 hours after you leave the surgery center.  2. Do not drive or use heavy equipment.  If you have weakness or tingling, don't drive or use heavy equipment until this feeling goes away.   3. Do not drink alcohol.   4. Avoid strenuous or risky activities.  Ask for help when climbing stairs.  5. You may feel lightheaded.  IF so, sit for a few minutes before standing.  Have someone help you get up.   6. If you have nausea (feel sick to your stomach): Drink only clear liquids such as apple juice, ginger ale, broth or 7-Up.  Rest may also help.  Be sure to drink enough fluids.  Move to a regular diet as you feel able.   7. You may have a slight fever.  Call the doctor if your fever is over 100 F (37.7 C) (taken under the tongue) or lasts longer than 24 hours.  8. You may have a dry mouth, a sore throat, muscle aches or trouble sleeping. These should go away after 24 hours.  9. Do not make important or legal decisions.               Tips for taking pain medications  To get the best pain relief possible, remember these points:    Take pain medications as directed, before pain becomes severe.    Pain medication can upset your stomach: taking it with food may help.    Constipation is a common side effect of pain medication. Drink plenty of  fluids.    Eat foods high in fiber. Take a stool softener if recommended by your doctor or pharmacist.    Do not drink alcohol, drive or operate machinery while taking pain medications.    Ask about other ways to control pain, such as with heat, ice or relaxation.    Call a doctor for any of the followin. Signs of infection (fever, growing tenderness at the surgery site, a large amount of drainage or bleeding, severe pain, foul-smelling drainage, redness, swelling).  2. It has been over 8 to 10 hours  since surgery and you are still not able to urinate (pass water).  3. Headache for over 24 hours.  4. Numbness, tingling or weakness the day after surgery (if you had spinal anesthesia).  Your doctor is:  Dr. Ty Augustin, ENT Otolaryngology: [phone]  Dr. Cyndi Rodas, Ophthalmology: [phone]               Or dial [phone] and ask for the resident on call for:  Ophthalmology or ENT  For emergency care, call the:  [facility]:  [phone] (TTY for hearing impaired: [phone])      Post-operative Instructions  Ophthalmic Plastic and Reconstructive Surgery    Cyndi Rodas M.D.     All instructions apply to the operated eye(s) or eyelid(s).    Wound care and personal care  ? If a patch or bandage has been placed, please leave this in place until seen by your physician. Ensure that the bandage does not get wet when you take a shower.  ? Apply ice compresses 15 minutes on 15 minutes off while awake for 2 days, then switch to warm water compresses 4 times a day until seen by your physician. For warm packs you can place a cup of dry uncooked rice in a clean cotton sock. Then place sock in microwave 30 seconds to one minute. Next place the warm sock into a plastic bag and wrap the bag with clean warm wet washcloth and place over operated eye.    ? You may shower or wash your hair the day after surgery. Do not bathe or go swimming for 1 week to prevent contamination of your wounds.  ? Do not apply make-up to the eyes or eyelids for 2 weeks after surgery.  ? Expect some swelling, bruising, black eye (even into the lower eyelids and cheeks). Also expect serum caking, crusting and discharge from the eye and/or incisions. A small amount of surface bleeding is normal for the first 48 hours.  ? Your eye(s) and eyelid(s) may be painful and tender. This is normal after surgery.  Use the pain medication as prescribed. If your pain does not improve despite the  medication, contact the  office.    Contact information and follow-up  ? Return to the Eye Clinic for a follow-up appointment with your physician as  scheduled. If no appointment has been scheduled:   - [facility] eye clinic: [phone] for an appointment with Dr. Rodas within 1 to 2 weeks from your date of surgery.   -  [facility] eye clinic: [phone] for an appointment with Dr. Rodas within 1 to 2 weeks from your date of surgery.     ? For severe pain, bleeding, or loss of vision, call the [facility] at [phone] or [facility] at [phone].     After hours or on weekends and holidays, call [phone] and ask to speak with the ophthalmologist on call.    An on call person can be reached after hours for concerns. The on call doctor should not call in medication refill requests after hours or on weekends, so please plan accordingly. An effort has been made to provide adequate pain medications following every surgery, and refills will not be provided in most instances. Narcotic pain medications cannot be called in.     Activity restrictions and driving  ? Avoid heavy lifting, bending, exercise or strenuous activity for 1 week after surgery.  You may resume other activities and return to work as tolerated.  ? You may not resume driving until have you stopped using narcotic pain medications (such as Norco, Percocet, Tylenol #3).    Medications  ? Restart all your regular home medications and eye drops. If you take Plavix or  Aspirin on a regular basis, wait for 72 hours after your surgery before restarting these in order to decrease the risk of bleeding complications.  ? Avoid aspirin and aspirin-like medications (Motrin, Aleve, Ibuprofen, [place] etc) for 72 hours to reduce the risk of bleeding. You may take Tylenol  (acetaminophen) for pain.  ? In addition to your home medications, take the following post-operative medications as prescribed by  your physician.    ? Apply antibiotic ointment to all sutures three times a day, and into the operated eye(s) at night.  ? Instill eye drops 3 times a day for 10 days.   ? Take antibiotic capsules or tablets as directed. Take steroid taper as directed.   ? Take pain pills at prescribed frequency as needed for pain. Zofran can be taken as needed for nausea.     ? WARNING: All the prescription pain medications listed above contain Tylenol  (acetaminophen). You must not take more than 4,000 mg of acetaminophen per  24-hour period. This is equivalent to 6 tablets of Darvocet, 12 tablets of Norco, Percocet or Tylenol #3. If you take other over-the-counter medications containing acetaminophen, you must take the amount of acetaminophen into account and reduce the number of prescribed pain pills accordingly.  ? The prescribed medications may make you drowsy. You must not drive a car,  operate heavy machinery or drink alcohol while taking them.  ? The prescribed pain medications may cause constipation and nausea. Take them  with some food to prevent a stomach upset. If you continue to experience nausea,  call your physician.

## 2017-04-27 NOTE — OR NURSING
Pt remains lethargic after 1.5 hr in phase 1 recovery. Pt responds appropriately to stimuli; needing supplemental oxygen to maintain sats >92%.  Dr. Benavides aware. Will continue to monitor and transfer to phase 2 recovery when appropriate. Family updated.

## 2017-04-27 NOTE — ANESTHESIA POSTPROCEDURE EVALUATION
Patient: Valentino Conner    Procedure(s):  Bilateral Endoscopic Medial Orbital Decompression, Bilateral Lateral Wall and Floor Orbital Decompression with [place] Navigation and Sonopet - Wound Class: I-Clean   - Wound Class: I-Clean    Diagnosis:Exophthalmos  Diagnosis Additional Information: No value filed.    Anesthesia Type:  General, ETT    Note:  Anesthesia Post Evaluation    Patient location during evaluation: Phase 2  Patient participation: Able to fully participate in evaluation  Level of consciousness: awake (sleepy but responds appropraitely.)  Pain management: adequate  Airway patency: patent  Cardiovascular status: acceptable  Respiratory status: acceptable  Hydration status: acceptable  PONV: none     Anesthetic complications: None          Last vitals:  Vitals:    04/27/17 1506 04/27/17 1521 04/27/17 1536   BP: 133/81 134/89 131/81   Resp: 16 17 16   Temp: 36.8  C (98.3  F) 36.8  C (98.2  F) 36.7  C (98  F)   SpO2: 92% 93% 92%         Electronically Signed By: Can Benavides MD  April 27, 2017  4:26 PM

## 2017-04-27 NOTE — BRIEF OP NOTE
[facility]    Brief Operative Note    Pre-operative diagnosis: Exophthalmos  Post-operative diagnosis * No post-op diagnosis entered *  Procedure: Procedure(s):  Bilateral Endoscopic Medial Orbital Decompression, Bilateral Three Wall Orbital Decompression with [place] Navigation and Sonopet - Wound Class: I-Clean   - Wound Class: I-Clean  Surgeon: Surgeon(s) and Role:  Panel 1:     * Ty Augustin MD - Primary    Panel 2:     * Cyndi Rodas MD - Primary  Anesthesia: General   Estimated blood loss: 30 mL  Drains: None  Specimens:   ID Type Source Tests Collected by Time Destination   A : Right Sinus Contents Tissue Sinus Contents, Right SURGICAL PATHOLOGY EXAM Ty Augustin MD 4/27/2017  9:24 AM    B : Left Sinus Contents Tissue Sinus Contents, Left SURGICAL PATHOLOGY EXAM Ty Augustin MD 4/27/2017  9:24 AM      Findings:   Normal sinus anatomy with appropriate medial orbital wall decompression performed, a gelfilm was placed in each middle meatus.  Complications: None.  Implants: None.

## 2017-04-27 NOTE — IP AVS SNAPSHOT
[facility]    [address] [place] [id number]    Phone:  [phone]    Fax:  [phone]                                       After Visit Summary   4/27/2017    Valentino Conner    MRN: [id number]           After Visit Summary Signature Page     I have received my discharge instructions, and my questions have been answered. I have discussed any challenges I see with this plan with the nurse or doctor.    ..........................................................................................................................................  Patient/Patient Representative Signature      ..........................................................................................................................................  Patient Representative Print Name and Relationship to Patient    ..................................................               ................................................  Date                                            Time    ..........................................................................................................................................  Reviewed by Signature/Title    ...................................................              ..............................................  Date                                                            Time

## 2017-04-27 NOTE — PROGRESS NOTES
Pt in phase 2 of recovery. Pt drowsy but responds quickly to voice. Pt denies sob and nausea. Just some soreness around his eyes. Pt has occasional drops in O2 sats when asleep. Dr. Benavides informed and evaluated pt prior to discharge, Dr. Benavides Ok'd pt to be discharged home. Pt using IS properly. VSS otherwise. Pt discharged home with Brother with DC information and medications.

## 2017-04-27 NOTE — OP NOTE
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      PROCEDURES PERFORMED:   1.  bilateral lateral wall orbital decompression with bone removal.   2.  bilateral partial maxillectomy (removal of orbital floor bone).   3. Temporary Tarsorrhaphy, right and left eye.     Intraoperative navigation was used to safely maximize bone removal  SURGEON:  Cyndi Rodas MD     ASSISTANTS: None     ANESTHESIA:  General with local infiltration of a 50/50 mixture, 2% lidocaine with epinephrine and 0.5% Marcaine.      COMPLICATIONS:  None.      ESTIMATED BLOOD LOSS: 10 mL.      HISTORY:  Valentino Conner  presented with severe exophthalmos secondary to thyroid eye disease.  After risks, benefits and alternatives to the proposed procedure were explained, informed consent was obtained.      DESCRIPTION OF PROCEDURE:  Valentino Conner  was brought to the operating room and placed supine on the operating table.  General anesthesia was induced.  Dr. Augustin completed his portion of the case, which was a bilateral medial wall decompression, done endoscopically. The patient was then redraped. The Nanoogo navigation system was calibrated. The lateral canthus and the inferior fornix were infiltrated with local anesthetic.  The area  was prepped and draped in the typical sterile ophthalmic fashion.  Attention was directed to the left side.  Lateral canthal incision was made with a 15 blade for 2 cm.  Dissection was carried down through the orbicularis with monopolar cautery.  Lateral canthotomy and  cantholysis was performed in the upper and lower eyelids with the monopolar cautery.  The Weitlaner retractor was placed.  Dissection was carried down to the orbital rim.  [place] elevator was used to elevate the periorbita from the lateral orbital wall.  A malleable retractor was placed to protect the orbital  contents.  Using a sonopet, the lateral wall was drilled down to the thin axial bone extending from of the rim internally, posteriorly to the thick bone of the trigone.  This was carried inferiorly to the lateral orbital floor, superiorly to the lacrimal fossa.  Intraoperative navigation was set to work with the sonopet, allowing maximal bone removal. Hemostasis was obtained with bonewax and monopolar cautery. A transconjunctival incision was then made in the fornix.  The Weitlaner was removed.  [place] retractor used to retract the lower eyelid anteriorly and a malleable retractor used to protect the globe.  The dissection was carried down to the orbital rim with a monopolar cautery.  [place] elevator was used to elevate the periosteum from the floor.  The thin bone of the orbital floor was  infractured with the [place] elevator.  Using Deloris forceps and Kerrison rongeurs, the orbital floor was removed nasal to the infraorbital nerve.  Laterally, we drilled with the sonopet through the thick bone and removed the bone again lateral to the nerve with the [place] rongeurs.  The bone removal was taken to the back wall of the maxillary sinus.  Hemostasis was obtained with a monopolar cautery.  The periorbita was opened and the fat allowed to prolapse into the decompressed spaces within the sinuses.  Laterally, the periorbita was opened and the intraconal fat removed with monopolar cautery.  Hemostasis was again obtained.  0.5 mL of Kenalog 40 was injected intraobitally in the inferolateral quadrant of the orbit through the opened periorbita. The superior and inferior analy of lateral canthal tendon were secured to the lateral orbital rim periosteum with a 5-0 Vicryl sutures.  Deep sutures of 5-0 Vicryl were used to close the orbicularis.  The skin was closed with running 6-0 plain gut suture.  Laterally a temporary tarsorrhaphy was fashioned with a  5-0 vicryl suture. Attention was directed to the right side and the  same procedures were performed.   On the right side the 4mm mindi coated drill was used as the Sonopet irrigation stopped working. Navigation was again used throughout the case to safely allow maximal bone removal. The patient tolerated the ophthalmic portions of the procedures well and was in stable condition.       Cyndi Rodas MD

## 2017-04-27 NOTE — ANESTHESIA CARE TRANSFER NOTE
Patient: Valentino Conner    Procedure(s):  Bilateral Endoscopic Medial Orbital Decompression, Bilateral Lateral Wall and Floor Orbital Decompression with Shields Navigation and Sonopet - Wound Class: I-Clean   - Wound Class: I-Clean    Diagnosis: Exophthalmos  Diagnosis Additional Information: No value filed.    Anesthesia Type:   General, ETT     Note:  Airway :Face Mask  Patient transferred to:PACU  Comments: VSS, no PONV, comfortable, report to RN      Vitals: (Last set prior to Anesthesia Care Transfer)    CRNA VITALS  4/27/2017 1225 - 4/27/2017 1303      4/27/2017             Pulse: 105    SpO2: 100 %    Resp Rate (observed): 15                Electronically Signed By: DEE Hunter CRNA  April 27, 2017  1:03 PM

## 2017-04-28 ENCOUNTER — MEDICAL CORRESPONDENCE (OUTPATIENT)
Dept: HEALTH INFORMATION MANAGEMENT | Facility: CLINIC | Age: 27
End: 2017-04-28

## 2017-04-28 NOTE — OP NOTE
SURGEON:  Ty Augustin MD      RESIDENT SURGEON:   1.  Storm Gongora MD   2.  Roxy Mitchell MD      PREOPERATIVE DIAGNOSES:   1.  Graves' disease.   2.  Graves-associated enophthalmos.      POSTOPERATIVE DIAGNOSES:   1.  Graves' disease.   2.  Graves-associated enophthalmos.      PROCEDURE PERFORMED:  Bilateral medial orbital wall decompression.      ANESTHESIA:  General.      COMPLICATIONS:  None.      ESTIMATED BLOOD LOSS:  30 mL.      FINDINGS:  Overall normal sinus anatomy with appropriate decompression of the medial orbital walls bilaterally.      INDICATIONS FOR PROCEDURE:  Valentino Conner is a 26-year-old man with Graves' disease, who has associated thyroid eye disease and significant exophthalmos with exposure keratitis.  After discussion of risks, benefits and alternatives, he was consented for the above stated procedure.      DESCRIPTION OF PROCEDURE:  The patient was brought to the operating room, placed supine on the operating table.  General endotracheal anesthesia was induced.  The patient was turned 90 degrees counterclockwise.  The [employer] Comparealth system was appropriately applied to the patient's face, and the patient was then prepped and draped in the usual fashion.  Afrin-soaked pledgets were placed into the bilateral nasal passages.  An institutional timeout was then performed to correctly identify patient, procedure and site.  The Afrin-soaked pledgets were removed and 1% lidocaine with 1:100,000 epinephrine was injected into the root of the middle turbinate, the inferior turbinate and posteriorly near the area of the sphenopalatine artery bilaterally.  An Afrin-soaked pledget was then placed on the left nasal passage, and we began with the right orbital decompression.  The 0-degree endoscope was inserted into the right nasal passage.  A [place] elevator was used to lateralize the inferior turbinate and medialize the middle turbinate to expose the middle meatus.  An uncinectomy was  performed by using a sickle knife to incise the fontanelle and a straight [place] to remove the uncinate.  The maxillary antrum was then entered with a ball-tipped seeker and then widened using a back biter and a straight TruCut.  After performing the antrostomy, an anterior and posterior ethmoidectomy was performed using a combination of curet and a straight [place] to fully expose the lamina papyracea.  The lamina papyracea was identified using the Stealth-guided suction.  We then made a small fracture in the lamina with a [place] and the bone was gently elevated off of the periorbita posteriorly with care taken to avoid violating the skull base.  We then used a right angle curet to loosely elevate and remove the lamina anteriorly.  There was appropriate decompression of orbital fat and periorbita into the sinus cavity.  The periorbita was entered with a sickle knife and fully decompressed.  An Afrin-soaked pledget was then placed into the middle meatus.  An identical procedure was performed on the left starting with the maxillary antrostomy, the anterior and posterior ethmoidectomy to expose the lamina papyracea and the loose elevation of the lamina papyracea off of the orbital contents.  An incision in the periorbita with a sickle knife and appropriate decompression was observed on the left.  Gelfoams were placed into the bilateral middle meatus and this concluded the ENT portion of the case.  Please see Dr. Hanna's dictation for details of the inferior and lateral orbital wall decompressions.      Dr. Augustin was present for all critical portions of the procedure.         PEG AUGUSTIN MD       As dictated by TAVO QUINTERO MD            D: 2017 19:11   T: 2017 08:57   MT: al      Name:     AJ VALENCIA   MRN:      [id number]        Account:        [id number]   :      1990           Procedure Date: 2017      Document: [id number]

## (undated) DEVICE — SPONGE RAY-TEC 4X8" 7318

## (undated) DEVICE — MASK PT REGISTRATION FOR STRYKER NAV SYSTEM 6001-386-000

## (undated) DEVICE — DRAPE U SPLIT 74X120" 29440

## (undated) DEVICE — SYR 10ML LL W/O NDL

## (undated) DEVICE — BASIN SET SINGLE STERILE 13752-624

## (undated) DEVICE — SUCTION MANIFOLD NEPTUNE 2 SYS 4 PORT 0702-020-000

## (undated) DEVICE — BLADE KNIFE SURG 15 371115

## (undated) DEVICE — NDL 25GA 2"  8881200441

## (undated) DEVICE — TUBING SET ASPIRATION W/EXT SONOPET  LF 5450-850-003

## (undated) DEVICE — SOL NACL 0.9% IRRIG 1000ML BOTTLE 2F7124

## (undated) DEVICE — TIP ASPIRATOR PAYNER ANG SONOPET UNV 25KHZ 360D 5450-800-312

## (undated) DEVICE — PACK ENT ENDOSCOPY CUSTOM ASC

## (undated) DEVICE — LINEN TOWEL PACK X5 5464

## (undated) DEVICE — BLADE KNIFE BEAVER SICKLE EDGE 5.0MM 377300

## (undated) DEVICE — DRAPE STERI TOWEL SM 1000

## (undated) DEVICE — PACK MINOR EYE CUSTOM ASC

## (undated) DEVICE — SPONGE COTTONOID 1/2X3" 20-07S

## (undated) DEVICE — TUBING SUCTION 12"X1/4" N612

## (undated) DEVICE — TUBING STRYKER IRRIGATION CASSETTE 5400-050-001

## (undated) DEVICE — SU PLAIN 6-0 G-1DA 18" 770G

## (undated) DEVICE — SUCTION MANIFOLD NEPTUNE 2 SYS 1 PORT 702-025-000

## (undated) DEVICE — GELFILM 12.5 SQ SM 1X2"

## (undated) DEVICE — GLOVE PROTEXIS POWDER FREE SMT 8.0  2D72PT80X

## (undated) DEVICE — ESU GROUND PAD ADULT W/CORD E7507

## (undated) DEVICE — SU VICRYL 5-0 P-3 18" UND J493H

## (undated) DEVICE — SOL NACL 0.9% INJ 1000ML BAG 2B1324X

## (undated) DEVICE — NDL 30GA 0.5" 305106

## (undated) DEVICE — Device

## (undated) DEVICE — EYE KNIFE CRESCENT 55DEG 373807

## (undated) DEVICE — LINER SUCTION US ASPIRATOR SONOPET CANISTER LF 5450-850-002

## (undated) DEVICE — GLOVE PROTEXIS MICRO 7.5  2D73PM75

## (undated) DEVICE — EYE PREP BETADINE 5% SOLUTION 30ML 0065-0411-30

## (undated) DEVICE — SOL WATER IRRIG 1000ML BOTTLE 2F7114

## (undated) DEVICE — ANTIFOG SOLUTION W/FOAM PAD 31142527

## (undated) DEVICE — SYR 03ML LL W/O NDL 309657

## (undated) RX ORDER — DEXAMETHASONE SODIUM PHOSPHATE 10 MG/ML
INJECTION, SOLUTION INTRAMUSCULAR; INTRAVENOUS
Status: DISPENSED
Start: 2017-04-27

## (undated) RX ORDER — PROPOFOL 10 MG/ML
INJECTION, EMULSION INTRAVENOUS
Status: DISPENSED
Start: 2017-04-27

## (undated) RX ORDER — ACETAMINOPHEN 325 MG/1
TABLET ORAL
Status: DISPENSED
Start: 2017-04-27

## (undated) RX ORDER — DEXAMETHASONE SODIUM PHOSPHATE 4 MG/ML
INJECTION, SOLUTION INTRA-ARTICULAR; INTRALESIONAL; INTRAMUSCULAR; INTRAVENOUS; SOFT TISSUE
Status: DISPENSED
Start: 2017-04-27

## (undated) RX ORDER — CEFAZOLIN SODIUM 1 G/3ML
INJECTION, POWDER, FOR SOLUTION INTRAMUSCULAR; INTRAVENOUS
Status: DISPENSED
Start: 2017-04-27

## (undated) RX ORDER — GLYCOPYRROLATE 0.2 MG/ML
INJECTION INTRAMUSCULAR; INTRAVENOUS
Status: DISPENSED
Start: 2017-04-27

## (undated) RX ORDER — GABAPENTIN 300 MG/1
CAPSULE ORAL
Status: DISPENSED
Start: 2017-04-27

## (undated) RX ORDER — ONDANSETRON 2 MG/ML
INJECTION INTRAMUSCULAR; INTRAVENOUS
Status: DISPENSED
Start: 2017-04-27

## (undated) RX ORDER — FENTANYL CITRATE 50 UG/ML
INJECTION, SOLUTION INTRAMUSCULAR; INTRAVENOUS
Status: DISPENSED
Start: 2017-04-27